# Patient Record
Sex: FEMALE | HISPANIC OR LATINO | Employment: PART TIME | URBAN - METROPOLITAN AREA
[De-identification: names, ages, dates, MRNs, and addresses within clinical notes are randomized per-mention and may not be internally consistent; named-entity substitution may affect disease eponyms.]

---

## 2023-11-02 ENCOUNTER — TRANSFERRED RECORDS (OUTPATIENT)
Dept: MULTI SPECIALTY CLINIC | Facility: CLINIC | Age: 23
End: 2023-11-02

## 2023-11-02 LAB
CHLAMYDIA - HIM PATIENT REPORTED: NORMAL
PAP SMEAR - HIM PATIENT REPORTED: NORMAL

## 2024-01-25 NOTE — PROGRESS NOTES
"Video-Visit Details    Type of service:  Video Visit    Video Start Time: 7:59 am   Video End Time: 8:40 am    Originating Location (pt. Location): Home    Distant Location (provider location): Offsite (providers home)     Platform used for Video Visit: [employer]      New 24 Week Healthy Lifestyle Nutrition Note    Reason for visit: Nutrition Assessment    Yanci Mckeon is a 23 year old female presenting today for new nutrition assessment consult. Pt is referred by Dr. Rosales 1/29/24.    Patient with Co-morbidities of obesity including:  GERD     PMH: irregular menses, depression, takes meds for Hashimotos     Working on establishing primary care in [facility] next month     ANTHROPOMETRICS:   Weight 1/29/24: 182 lbs with BMI 34.39    Estimated body mass index is 34.39 kg/m  as calculated from the following:    Height as of 1/29/24: 1.549 m (5' 1\").    Weight as of 1/29/24: 82.6 kg (182 lb).    MEDICATIONS FOR WEIGHT LOSS:  None    SUPPLEMENTS:  None    NUTRITION HISTORY:  NKFA  No intolerances noted  Not huge on fruit  Has a sweet tooth  Previous weight loss attempts include: exercise, restriction (eating 1 meal/day) - recognizes this was unhealthy but was only thing that helped at that time  RD before: No    Pt goals: weight loss to improve overall health    Current eating habits are inconsistent per pt.   Typically eats 2-3 meals. Occ may have 1 larger meal.   Snacking varies as well - maybe 3-4x/week on average (as making food typically).     Typical food choices  B - eggs, toast with butter  L - pasta  D - rice with salmon/turkey OR out to eat 3-4x/week    Meal preps but often eats something different than the prepped food.     Feels she needs to increase protein/vegetable intake.     Pt shared she struggles with some binge eating - eating past fullness.     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    INTERVENTION:  Nutrition Prescription:  Recommended energy/nutrient modification.    Implementation:  Discussed use of 24-week Journal or application  Reviewed estimated kcal and macronutrient needs  Discussed implementation of exercise  Discussed hydration    Use of meal replacements: no    Patient Understanding: good    GOALS:  1.  Activity goal: Aim to walk on treadmill for 30 min 2-3x/week  2.  Hydration goal: Aim for 2 Fausto s/day (60 oz of water)  3. Food goal: Aim to increase vegetable intake to 1 cup/day  4. Food goal: Aim to be more mindful of fullness when snacking. Goal of stopping when feeling full.     Applications to consider:  - Lose it  - MyfitnessPal  -Better me    Estimated kcal needs for weight loss:    ~1300 kcal/day (with little to no activity accounted for, would recommend more on higher activity days)  *Don t recommend going lower than 1200 kcal     Recommended Macronutrient Needs    Dietary guidelines for Americans   45-65% Carbohydrates   10-35% Protein  20-35% Fat    General Weight loss recommendations   40% Carbohydrates (~130 g/day 1300 kcal/day)   30% Protein  (~97 g/day based on 1300 kcal/day)  30% Fat (~43 g/day based on 1300 kcal/day)     FOLLOW UP  Thursday, March 7th at 2:00 pm     TIME SPENT WITH PATIENT: 41 Minutes     LELA Schofield, RD, LD

## 2024-01-29 ENCOUNTER — VIRTUAL VISIT (OUTPATIENT)
Dept: ENDOCRINOLOGY | Facility: CLINIC | Age: 24
End: 2024-01-29
Payer: COMMERCIAL

## 2024-01-29 VITALS — WEIGHT: 182 LBS | BODY MASS INDEX: 34.36 KG/M2 | HEIGHT: 61 IN

## 2024-01-29 DIAGNOSIS — E66.811 CLASS 1 OBESITY DUE TO EXCESS CALORIES WITHOUT SERIOUS COMORBIDITY WITH BODY MASS INDEX (BMI) OF 34.0 TO 34.9 IN ADULT: Primary | ICD-10-CM

## 2024-01-29 DIAGNOSIS — E66.09 CLASS 1 OBESITY DUE TO EXCESS CALORIES WITHOUT SERIOUS COMORBIDITY WITH BODY MASS INDEX (BMI) OF 34.0 TO 34.9 IN ADULT: Primary | ICD-10-CM

## 2024-01-29 PROCEDURE — 99204 OFFICE O/P NEW MOD 45 MIN: CPT | Mod: 95 | Performed by: INTERNAL MEDICINE

## 2024-01-29 ASSESSMENT — PAIN SCALES - GENERAL: PAINLEVEL: NO PAIN (0)

## 2024-01-29 NOTE — PROGRESS NOTES
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 She was a competitive swimmer through college, and got burnt out with working out. She took a long hiatus and simultaneously her diet quality had worsened. These factors together contributed to her weight gain. Currently she says she on some days she has difficulty limiting her food intake, she meals preps but often will eat something else and not the prepped food. She is mostly sedentary. Her weight is distressing for her, especially on days where she believes that she is overeating.     She denies excessive alcohol intake, denies smoking and use of illicit drugs.       PAST MEDICAL HISTORY  There is no problem list on file for this patient.        MEDICATIONS  Current Outpatient Medications   Medication Sig Dispense Refill    omeprazole (PRILOSEC) 20 MG DR capsule 1 capsule 30 minutes before morning meal Orally Once a day for 30 days           PAST SURGICAL HISTORY  No past surgical history on file.      FAMILY HISTORY  No family history on file.      ALLERGIES   No Known Allergies      OBJECTIVE    Vitals  Ht 1.549 m (5' 1\")   Wt 82.6 kg (182 lb)   BMI 34.39 kg/m      Last 6 BPs  BP Readings from Last 6 Encounters:   No data found for BP       Physical Exam  Virtual Visit    ASSESSMENT AND PLAN    Obesity  Discussed the 24 week plan through [facility]. Patient is very interested and able to afford the plan as well.   - 24 week lifestyle plan  - RTC as needed    Patient understands and agrees with the above assessment and plan. Patient was staffed and seen with Dr." Bobby    Follow up  RTC as needed      Thu Gray, DO  Internal Medicine PGY-3

## 2024-01-29 NOTE — LETTER
"1/29/2024       RE: Yanci Mckeon  [address]  [place] [address]     Dear Colleague,    Thank you for referring your patient, Yanci Mckeon, to the [facility] WEIGHT MANAGEMENT CLINIC [place] at [facility]. Please see a copy of my visit note below.                     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 clinic to discuss the 24-week lifestyle plan offered through [facility].     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 She was a competitive swimmer through college, and got burnt out with working out. She took a long hiatus and simultaneously her diet quality had worsened. These factors together contributed to her weight gain. Currently she says she on some days she has difficulty limiting her food intake, she meals preps but often will eat something else and not the prepped food. She is mostly sedentary. Her weight is distressing for her, especially on days where she believes that she is overeating.     She denies excessive alcohol intake, denies smoking and use of illicit drugs.       PAST MEDICAL HISTORY  There is no problem list on file for this patient.        MEDICATIONS  Current Outpatient Medications   Medication Sig Dispense Refill    omeprazole (PRILOSEC) 20 MG DR capsule 1 capsule 30 minutes before morning meal Orally Once a day for 30 days           PAST SURGICAL HISTORY  No past surgical history on file.      FAMILY HISTORY  No family history on file.      ALLERGIES   No Known Allergies      OBJECTIVE    Vitals  Ht 1.549 m (5' 1\")   Wt 82.6 kg (182 lb)   BMI 34.39 kg/m      Last 6 BPs  BP Readings from Last 6 Encounters:   No data found for BP "       Physical Exam  Virtual Visit    ASSESSMENT AND PLAN    Obesity  Discussed the 24 week plan through [facility]. Patient is very interested and able to afford the plan as well.   - 24 week lifestyle plan  - RTC as needed    Patient understands and agrees with the above assessment and plan. Patient was staffed and seen with Dr. Rosales    Follow up  RTC as needed      Thu Gray, DO  Internal Medicine PGY-3      Virtual Visit Details    Attest to note of medical resident which reflects our work together.    Joined the call at 1/29/2024, 9:20:23 am.  Left the call at 1/29/2024, 9:45:35 am    Originating Location (pt. Location): Home    Distant Location (provider location):  Off-site  Platform used for Video Visit: Lien Rosales MD

## 2024-01-29 NOTE — NURSING NOTE
Is the patient currently in the state of [place]? NO    Visit mode:VIDEO    If the visit is dropped, the patient can be reconnected by: VIDEO VISIT: Text to cell phone:   Telephone Information:   Mobile [phone]       Will anyone else be joining the visit? NO  (If patient encounters technical issues they should call [phone])    How would you like to obtain your AVS? MyChart    Are changes needed to the allergy or medication list? No  And pt stated staking levothyroxine 25 micrograms   Reason for visit: Consult    Melany LAUF

## 2024-01-29 NOTE — PROGRESS NOTES
Virtual Visit Details    Attest to note of medical resident which reflects our work together.    Joined the call at 1/29/2024, 9:20:23 am.  Left the call at 1/29/2024, 9:45:35 am    Originating Location (pt. Location): Home    Distant Location (provider location):  Off-site  Platform used for Video Visit: Lien

## 2024-01-30 ENCOUNTER — VIRTUAL VISIT (OUTPATIENT)
Dept: ENDOCRINOLOGY | Facility: CLINIC | Age: 24
End: 2024-01-30
Payer: COMMERCIAL

## 2024-01-30 VITALS — HEIGHT: 61 IN | WEIGHT: 182 LBS | BODY MASS INDEX: 34.36 KG/M2

## 2024-01-30 DIAGNOSIS — E66.9 OBESITY: ICD-10-CM

## 2024-01-30 DIAGNOSIS — Z71.3 NUTRITIONAL COUNSELING: Primary | ICD-10-CM

## 2024-01-30 PROCEDURE — 97802 MEDICAL NUTRITION INDIV IN: CPT | Mod: 95 | Performed by: DIETITIAN, REGISTERED

## 2024-01-30 PROCEDURE — 99207 PR NO CHARGE LOS: CPT | Mod: 95 | Performed by: DIETITIAN, REGISTERED

## 2024-01-30 ASSESSMENT — PAIN SCALES - GENERAL: PAINLEVEL: NO PAIN (0)

## 2024-01-30 NOTE — LETTER
"1/30/2024       RE: Yanci Mckeon  [address]  [place] [address]     Dear Colleague,    Thank you for referring your patient, Yanci Mckeon, to the [facility] WEIGHT MANAGEMENT CLINIC [place] at [facility]. Please see a copy of my visit note below.    Video-Visit Details    Type of service:  Video Visit    Video Start Time: 7:59 am   Video End Time: 8:40 am    Originating Location (pt. Location): Home    Distant Location (provider location): Offsite (providers home)     Platform used for Video Visit: [employer] 24 Week Healthy Lifestyle Nutrition Note    Reason for visit: Nutrition Assessment    Yanci Mckeon is a 23 year old female presenting today for new nutrition assessment consult. Pt is referred by Dr. Rosales 1/29/24.    Patient with Co-morbidities of obesity including:  GERD     PMH: irregular menses, depression, takes meds for Hashimotos     Working on establishing primary care in [facility] next month     ANTHROPOMETRICS:   Weight 1/29/24: 182 lbs with BMI 34.39    Estimated body mass index is 34.39 kg/m  as calculated from the following:    Height as of 1/29/24: 1.549 m (5' 1\").    Weight as of 1/29/24: 82.6 kg (182 lb).    MEDICATIONS FOR WEIGHT LOSS:  None    SUPPLEMENTS:  None    NUTRITION HISTORY:  NKFA  No intolerances noted  Not huge on fruit  Has a sweet tooth  Previous weight loss attempts include: exercise, restriction (eating 1 meal/day) - recognizes this was unhealthy but was only thing that helped at that time  RD before: No    Pt goals: weight loss to improve overall health    Current eating habits are inconsistent per pt.   Typically eats 2-3 meals. Occ may have 1 larger meal.   Snacking varies as well - maybe 3-4x/week on average (as making food typically).     Typical food choices  B - eggs, toast with butter  L - pasta  D - rice with salmon/turkey OR out to eat 3-4x/week    Meal preps but often " "eats something different than the prepped food.     Feels she needs to increase protein/vegetable intake.     Pt shared she struggles with some binge eating - eating past fullness.     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    INTERVENTION:  Nutrition Prescription:  Recommended energy/nutrient modification.    Implementation:  Discussed use of 24-week Journal or application  Reviewed estimated kcal and macronutrient needs  Discussed implementation of exercise  Discussed hydration    Use of meal replacements: no    Patient Understanding: good    GOALS:  1.  Activity goal: Aim to walk on treadmill for 30 min 2-3x/week  2.  Hydration goal: Aim for 2 Kiel s/day (60 oz of water)  3. Food goal: Aim to increase vegetable intake to 1 cup/day  4. Food goal: Aim to be more mindful of fullness when snacking. Goal of stopping when feeling full.     Applications to consider:  - Lose it  - MyfitnessPal  -Better me    Estimated kcal needs for weight loss:    ~1300 kcal/day (with little to no activity accounted for, would recommend more on higher activity days)  *Don t recommend going lower than 1200 kcal     Recommended Macronutrient Needs    Dietary guidelines for Americans   45-65% Carbohydrates   10-35% Protein  20-35% Fat    General Weight loss recommendations   40% Carbohydrates (~130 g/day 1300 kcal/day)   30% Protein  (~97 g/day based on 1300 " kcal/day)  30% Fat (~43 g/day based on 1300 kcal/day)     FOLLOW UP  Thursday, March 7th at 2:00 pm     TIME SPENT WITH PATIENT: 41 Minutes     LELA Schofield, RD, LD

## 2024-01-30 NOTE — PATIENT INSTRUCTIONS
GOALS:  1.  Activity goal: Aim to walk on treadmill for 30 min 2-3x/week  2.  Hydration goal: Aim for 2 Fausto s/day (60 oz of water)  3. Food goal: Aim to increase vegetable intake to 1 cup/day  4. Food goal: Aim to be more mindful of fullness when snacking. Goal of stopping when feeling full.     Applications to consider:  - Lose it  - MyfitnessPal  -Better me    Estimated kcal needs for weight loss:    ~1300 kcal/day (with little to no activity accounted for, would recommend more on higher activity days)  *Don t recommend going lower than 1200 kcal     Recommended Macronutrient Needs    Dietary guidelines for Americans   45-65% Carbohydrates   10-35% Protein  20-35% Fat    General Weight loss recommendations   40% Carbohydrates (~130 g/day 1300 kcal/day)   30% Protein  (~97 g/day based on 1300 kcal/day)  30% Fat (~43 g/day based on 1300 kcal/day)     FOLLOW UP  Thursday, March 7th at 2:00 pm     Elsy Galeana (Duncan), MPP-D, RD, LD  Clinic #: [phone]

## 2024-01-30 NOTE — NURSING NOTE
Is the patient currently in the state of [place]? YES    Visit mode:VIDEO    If the visit is dropped, the patient can be reconnected by: VIDEO VISIT: Send to e-mail at: [email or web address]    Will anyone else be joining the visit? NO  (If patient encounters technical issues they should call [phone])    How would you like to obtain your AVS? MyChart    Are changes needed to the allergy or medication list? N/A    Reason for visit: Consult (24 Week Plan RD)    Iman DE LA CRUZ

## 2024-02-01 ENCOUNTER — VIRTUAL VISIT (OUTPATIENT)
Dept: ENDOCRINOLOGY | Facility: CLINIC | Age: 24
End: 2024-02-01

## 2024-02-01 DIAGNOSIS — E66.9 OBESITY: Primary | ICD-10-CM

## 2024-02-01 PROCEDURE — 99207 PR NO CHARGE LOS: CPT | Mod: 95

## 2024-02-01 NOTE — PROGRESS NOTES
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  Competitive Swimmer in College  Dr Rosales and Elsy Galeana  Will continue to see Elsy and me and not Bobby;does not think she needs to see him again or may try another provider, and not currently on any weight management med    Likes to walk on my off work days  Walking for 30 minutes 3 times per week    OVERALL GOALS FOR THE 24 week plan:  I want to be on a healthy path; to be proactive with positive health outcomes  Regular consistent Meal times  Stop eating when I am full; portion control  Positive outlook about my body; confident in my body  Better mood; less depressed and less anxious, bc I accept and am confident in my body      NOTES:  1300 calorie plan, per RD  Feel confident with myself and my body with my significant other  Gym; is double edge sword; I feel good but I compare. Social Media; Good friends who  Are too unhealthy with eating patterns    Brainstorm ideas to improve body image/ self esteem; Identify when I am doing it. \"Name it to Tame it\"   Wishing my friends well and hope the best for them. I am on my own journey  Limit time on [employer] and [employer] to 2 hours. Set time limits on my phone and obey them  Journal one kind thing about myself each day  Journal about my ideal non-work day      1.         Activity goal: Aim to walk on treadmill for 30 min 2-3x/week  2.         Hydration goal: Aim for 2 Fausto s/day (60 oz of water)  3.         Food goal: Aim to increase vegetable intake to 1 cup/day  4.         Food goal: Aim to be more mindful of fullness when snacking. Goal of stopping when feeling full.   5.          Myfitness pal    HEALTH  VISIT: Feb 12 at 9:30 am, video visit     40 minutes time spent  "

## 2024-02-12 ENCOUNTER — VIRTUAL VISIT (OUTPATIENT)
Dept: ENDOCRINOLOGY | Facility: CLINIC | Age: 24
End: 2024-02-12

## 2024-02-12 DIAGNOSIS — E66.3 OVERWEIGHT: Primary | ICD-10-CM

## 2024-02-12 PROCEDURE — 99207 PR NO CHARGE LOS: CPT | Mod: 95

## 2024-02-12 PROCEDURE — 99207 PR MEDICAL WEIGHT MANAGEMENT PROGRAM EMPLOYEE ENROLLMENT: CPT | Mod: 95

## 2024-02-12 NOTE — PROGRESS NOTES
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 Discussed ideas of self care for emotional wellbeing. Not sleeping well; not feeling hungry    GOALS/REMINDERS:    1.) Continue with my daily wellness journal  2.) Try out a calming wind down routine, 30 minutes before bed (screen-free; or use screen mindfully) Aim for 6 or more hours of sleep per night  3.) Consider Journaling/Free Writing to work through difficult thoughts and emotions  4.) 5 senses Mindfulness each day; chose one way to be in the moment with a senses/sensory experience and record in my journal (ex: feel sunlight on my face)  5.) Positive Self Talk; I am doing meaningful purposeful work and I am proud of that. I know deep down that that person is not for me. I will get through it. I deserve love at the highest level and I know I will have that in time  (Poetry for inspiration and support)        NEXT HEALTH  VISIT SCHEDULED FOR: FEB 21 at 9 am, video visit      FOLLOW-UP:    To schedule your next visit, please call [phone].      TIME: 45 minutes         SIGNATURE:  Micky Altamirano, Certified Health  on 5/6/2021 at 10:37 AM

## 2024-02-12 NOTE — LETTER
2/12/2024       RE: Yanci Mckeon  [address]  [place] [address]     Dear Colleague,    Thank you for referring your patient, Yanci Mckeon, to the [facility] WEIGHT MANAGEMENT CLINIC [facility]. Please see a copy of my visit note below.    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 Discussed ideas of self care for emotional wellbeing. Not sleeping well; not feeling hungry    GOALS/REMINDERS:    1.) Continue with my daily wellness journal  2.) Try out a calming wind down routine, 30 minutes before bed (screen-free; or use screen mindfully) Aim for 6 or more hours of sleep per night  3.) Consider Journaling/Free Writing to work through difficult thoughts and emotions  4.) 5 senses Mindfulness each day; chose one way to be in the moment with a senses/sensory experience and record in my journal (ex: feel sunlight on my face)  5.) Positive Self Talk; I am doing meaningful purposeful work and I am proud of that. I know deep down that that person is not for me. I will get through it. I deserve love at the highest level and I know I will have that in time  (Poetry for inspiration and support)        NEXT HEALTH  VISIT SCHEDULED FOR: FEB 21 at 9 am,  video visit      FOLLOW-UP:    To schedule your next visit, please call [phone].      TIME: 45 minutes         SIGNATURE:  Amy Noland, Certified Health  on 5/6/2021 at 10:37 AM

## 2024-02-21 ENCOUNTER — VIRTUAL VISIT (OUTPATIENT)
Dept: ENDOCRINOLOGY | Facility: CLINIC | Age: 24
End: 2024-02-21

## 2024-02-21 DIAGNOSIS — E66.3 OVERWEIGHT: Primary | ICD-10-CM

## 2024-02-21 PROCEDURE — 99207 PR NO CHARGE LOS: CPT | Mod: 95

## 2024-02-21 NOTE — LETTER
2/21/2024       RE: Yanci Mckeon  [address]  [place] [address]     Dear Colleague,    Thank you for referring your patient, Yanci Mckeon, to the [facility] WEIGHT MANAGEMENT CLINIC [place] at [facility]. Please see a copy of my visit note below.    24 wk plan visit 2  SHANT Rosales      1.) Journal  2.) Bedtime Routine  3.) Positive Self Talk around break-up      GOALS:  Tredmil for 30 min, 4 days per week (I will set a time on google calendar the night before)  I will eat 1300 k per day  I will continue to read personal growth books and journal when helpful  I will continue to get 7--8 hours of sleep and have a 10-15 min bedtime routine    NEXT HEALTH  VISIT: MARCH 8 at 9 am, video visit    TIME SPENT: 30 min    Amy ALCARAZ-[facility], National Board Certified Health &   Lead Health   [facility] Weight Management  [email or web address]   To schedule: [phone]   Gender pronouns: she/her

## 2024-02-21 NOTE — PROGRESS NOTES
24 wk plan visit 2  SHANT Rosales      1.) Journal  2.) Bedtime Routine  3.) Positive Self Talk around break-up      GOALS:  Tredmil for 30 min, 4 days per week (I will set a time on google calendar the night before)  I will eat 1300 k per day  I will continue to read personal growth books and journal when helpful  I will continue to get 7--8 hours of sleep and have a 10-15 min bedtime routine    NEXT HEALTH  VISIT: MARCH 8 at 9 am, video visit    TIME SPENT: 30 min    Amy Noland  [facility], National Board Certified Health &   Lead Health   [facility] Comprehensive Weight Management  [email or web address]   To schedule: [phone]   Gender pronouns: she/her

## 2024-03-06 NOTE — PROGRESS NOTES
"Video-Visit Details    Type of service:  Video Visit    Video Start Time: 2:02 pm  Video End Time: 2:16 pm    Originating Location (pt. Location): Home    Distant Location (provider location): Offsite (providers home)     Platform used for Video Visit: [employer]    24 Week Healthy Lifestyle Nutrition Note    Reason for visit: Nutrition Assessment    Yanci Mckeon is a 23 year old female presenting today for nutrition assessment consult. Pt is referred by Dr. Rosales 1/29/24.    Patient with Co-morbidities of obesity including:  GERD     PMH: irregular menses, depression, takes meds for Hashimotos     ANTHROPOMETRICS:   Weight 1/29/24: 182 lbs with BMI 34.39    Estimated body mass index is 33.29 kg/m  as calculated from the following:    Height as of this encounter: 1.575 m (5' 2\").    Weight as of 1/30/24: 82.6 kg (182 lb).    Current weight: 182 lbs, pt report from 1/30    MEDICATIONS FOR WEIGHT LOSS:  None    SUPPLEMENTS:  None    NUTRITION HISTORY:  NKFA  No intolerances noted  Not huge on fruit, vegetables or nuts per pt.   Has a sweet tooth  Previous weight loss attempts include: exercise, restriction (eating 1 meal/day) - recognizes this was unhealthy but was only thing that helped at that time  RD before: No    Pt goals: weight loss to improve overall health    Current eating habits are inconsistent per pt.   Typically eats 2-3 meals. Occ may have 1 larger meal.   Snacking varies as well - maybe 3-4x/week on average (as making food typically).     Typical food choices  B - eggs, toast with butter  L - pasta  D - rice with salmon/turkey OR out to eat 3-4x/week    Meal preps but often eats something different than the prepped food.     Feels she needs to increase protein/vegetable intake.     Pt shared she struggles with some binge eating - eating past fullness.     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 Gets nauseous with bigger meals.     Hydration: 1 Fausto of water/day at most (30 oz), occ matcha or coffee     PA: signed up for 5k in April    Previous goals:  1.  Activity goal: Aim to walk on treadmill for 30 min 2-3x/week - In progress, her and her roommate recently signed up for a 5K in April. Hoping this increase in movement will help with appetite.   2.  Hydration goal: Aim for 2 Fausto s/day (60 oz of water) - In progress, getting 1 per day at most  3. Food goal: Aim to increase vegetable intake to 1 cup/day - N/A right now  4. Food goal: Aim to be more mindful of fullness when snacking. Goal of stopping when feeling full. 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    INTERVENTION:  Nutrition Prescription:  Recommended energy/nutrient modification.    Implementation:  Discussed use of 24-week Journal or application  Reviewed estimated kcal and macronutrient needs  Discussed implementation of exercise  Discussed hydration    Use of meal replacements: no    Patient Understanding: good    GOALS:    Recommend utilizing protein shakes to help with intake  Consider smaller, more frequent eating  Consider grab n go options such as individual carrot bags  Hydration goal: Aim for 2 Fausto s/day (60 oz of water  Activity goal: 5K next month    Snack ideas:   - Cheese   - Cottage cheese   - Protein bars   - Protein shakes   - Yogurt    - Hard  boiled eggs   - Meat sticks (recommend turkey more often than beef/pork)   - Raw veggies with dip (hummus, ranch, dill dip, guacamole, cottage cheese)    - Puff corn    - Popcorn    - Crackers      Applications to consider:  - Lose it  - MyfitnessPal  -Better me    Estimated kcal needs for weight loss:    ~1300 kcal/day (with little to no activity accounted for, would recommend more on higher activity days)  *Don t recommend going lower than 1200 kcal     Recommended Macronutrient Needs    Dietary guidelines for Americans   45-65% Carbohydrates   10-35% Protein  20-35% Fat    General Weight loss recommendations   40% Carbohydrates (~130 g/day 1300 kcal/day)   30% Protein  (~97 g/day based on 1300 kcal/day)  30% Fat (~43 g/day based on 1300 kcal/day)     FOLLOW UP  Thursday, April 4th at 9:30 am    TIME SPENT WITH PATIENT: 14 Minutes     LELA Schofield, RD, LD

## 2024-03-07 ENCOUNTER — VIRTUAL VISIT (OUTPATIENT)
Dept: ENDOCRINOLOGY | Facility: CLINIC | Age: 24
End: 2024-03-07
Payer: COMMERCIAL

## 2024-03-07 VITALS — BODY MASS INDEX: 33.29 KG/M2 | HEIGHT: 62 IN

## 2024-03-07 DIAGNOSIS — Z71.3 NUTRITIONAL COUNSELING: Primary | ICD-10-CM

## 2024-03-07 DIAGNOSIS — E66.9 OBESITY: ICD-10-CM

## 2024-03-07 PROCEDURE — 99207 PR NO CHARGE LOS: CPT | Mod: 95 | Performed by: DIETITIAN, REGISTERED

## 2024-03-07 PROCEDURE — 97803 MED NUTRITION INDIV SUBSEQ: CPT | Mod: 95 | Performed by: DIETITIAN, REGISTERED

## 2024-03-07 ASSESSMENT — PAIN SCALES - GENERAL: PAINLEVEL: NO PAIN (0)

## 2024-03-07 NOTE — LETTER
"3/7/2024       RE: Yanci Mckeon  [address]  [place] [facility] [address]     Dear Colleague,    Thank you for referring your patient, Yanci Mckeon, to the [facility]NAG[facility] [place] at [facility]. Please see a copy of my visit note below.    Video-Visit Details    Type of service:  Video Visit    Video Start Time: 2:02 pm  Video End Time: 2:16 pm    Originating Location (pt. Location): Home    Distant Location (provider location): Offsite (providers home)     Platform used for Video Visit: [employer]    24 Week Healthy Lifestyle Nutrition Note    Reason for visit: Nutrition Assessment    Yanci Mckeon is a 23 year old female presenting today for nutrition assessment consult. Pt is referred by Dr. Rosales 1/29/24.    Patient with Co-morbidities of obesity including:  GERD     PMH: irregular menses, depression, takes meds for Hashimotos     ANTHROPOMETRICS:   Weight 1/29/24: 182 lbs with BMI 34.39    Estimated body mass index is 33.29 kg/m  as calculated from the following:    Height as of this encounter: 1.575 m (5' 2\").    Weight as of 1/30/24: 82.6 kg (182 lb).    Current weight: 182 lbs, pt report from 1/30    MEDICATIONS FOR WEIGHT LOSS:  None    SUPPLEMENTS:  None    NUTRITION HISTORY:  NKFA  No intolerances noted  Not huge on fruit, vegetables or nuts per pt.   Has a sweet tooth  Previous weight loss attempts include: exercise, restriction (eating 1 meal/day) - recognizes this was unhealthy but was only thing that helped at that time  RD before: No    Pt goals: weight loss to improve overall health    Current eating habits are inconsistent per pt.   Typically eats 2-3 meals. Occ may have 1 larger meal.   Snacking varies as well - maybe 3-4x/week on average (as making food typically).     Typical food choices  B - eggs, toast with butter  L - pasta  D - rice with salmon/turkey OR out to eat 3-4x/week    Meal preps but " "often eats something different than the prepped food.     Feels she needs to increase protein/vegetable intake.     Pt shared she struggles with some binge eating - eating past fullness.     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 Gets nauseous with bigger meals.     Hydration: 1 Kiel of water/day at most (30 oz), occ matcha or coffee     PA: signed up for 5k in April    Previous goals:  1.  Activity goal: Aim to walk on treadmill for 30 min 2-3x/week - In progress, her and her roommate recently signed up for a 5K in April. Hoping this increase in movement will help with appetite.   2.  Hydration goal: Aim for 2 Kiel s/day (60 oz of water) - In progress, getting 1 per day at most  3. Food goal: Aim to increase vegetable intake to 1 cup/day - N/A right now  4. Food goal: Aim to be more mindful of fullness when snacking. Goal of stopping when feeling full. 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    INTERVENTION:  Nutrition Prescription:  Recommended energy/nutrient modification.    Implementation:  Discussed use of 24-week Journal or application  Reviewed estimated kcal and macronutrient needs  Discussed implementation of exercise  Discussed hydration    Use of meal replacements: no    Patient Understanding: good    GOALS:    Recommend " utilizing protein shakes to help with intake  Consider smaller, more frequent eating  Consider grab n go options such as individual carrot bags  Hydration goal: Aim for 2 Fausto s/day (60 oz of water  Activity goal: 5K next month    Snack ideas:   - Cheese   - Cottage cheese   - Protein bars   - Protein shakes   - Yogurt    - Hard boiled eggs   - Meat sticks (recommend turkey more often than beef/pork)   - Raw veggies with dip (hummus, ranch, dill dip, guacamole, cottage cheese)    - Puff corn    - Popcorn    - Crackers      Applications to consider:  - Lose it  - MyfitnessPal  -Better me    Estimated kcal needs for weight loss:    ~1300 kcal/day (with little to no activity accounted for, would recommend more on higher activity days)  *Don t recommend going lower than 1200 kcal     Recommended Macronutrient Needs    Dietary guidelines for Americans   45-65% Carbohydrates   10-35% Protein  20-35% Fat    General Weight loss recommendations   40% Carbohydrates (~130 g/day 1300 kcal/day)   30% Protein  (~97 g/day based on 1300 kcal/day)  30% Fat (~43 g/day based on 1300 kcal/day)     FOLLOW UP  Thursday, April 4th at 9:30 am    TIME SPENT WITH PATIENT: 14 Minutes     LELA Schofield, RD, LD

## 2024-03-07 NOTE — PATIENT INSTRUCTIONS
GOALS:    Recommend utilizing protein shakes to help with intake  Consider smaller, more frequent eating  Consider grab n go options such as individual carrot bags  Hydration goal: Aim for 2 Fausto s/day (60 oz of water  Activity goal: 5K next month    Snack ideas:   - Cheese   - Cottage cheese   - Protein bars   - Protein shakes   - Yogurt    - Hard boiled eggs   - Meat sticks (recommend turkey more often than beef/pork)   - Raw veggies with dip (hummus, ranch, dill dip, guacamole, cottage cheese)    - Puff corn    - Popcorn    - Crackers      Applications to consider:  - Lose it  - MyfitnessPal  -Better me    Estimated kcal needs for weight loss:    ~1300 kcal/day (with little to no activity accounted for, would recommend more on higher activity days)  *Don t recommend going lower than 1200 kcal     Recommended Macronutrient Needs    Dietary guidelines for Americans   45-65% Carbohydrates   10-35% Protein  20-35% Fat    General Weight loss recommendations   40% Carbohydrates (~130 g/day 1300 kcal/day)   30% Protein  (~97 g/day based on 1300 kcal/day)  30% Fat (~43 g/day based on 1300 kcal/day)     FOLLOW UP  Thursday, April 4th at 9:30 am    Elsy Galeana ([place]), VIRGIL-D, RD, LD  Clinic #: [phone]

## 2024-03-07 NOTE — NURSING NOTE
Is the patient currently in the state of [place]? YES    Visit mode:VIDEO    If the visit is dropped, the patient can be reconnected by: VIDEO VISIT: Text to cell phone:   Telephone Information:   Mobile [phone]       Will anyone else be joining the visit? NO  (If patient encounters technical issues they should call [phone]    How would you like to obtain your AVS? MyChart    Are changes needed to the allergy or medication list? N/A  Please remove any meds marked not taking and any flagged for removal.    Reason for visit: RECHECK    Wt/ht other than 24 hrs:  none given for wt  Pain more than one location:  no  Sola LAUF

## 2024-03-08 ENCOUNTER — VIRTUAL VISIT (OUTPATIENT)
Dept: SURGERY | Facility: CLINIC | Age: 24
End: 2024-03-08

## 2024-03-08 DIAGNOSIS — E66.3 OVERWEIGHT: Primary | ICD-10-CM

## 2024-03-08 PROCEDURE — 99207 PR MWM HEALTH COACH NO CHARGE: CPT | Mod: 95

## 2024-03-08 NOTE — PROGRESS NOTES
24 wk visit 3 Bobby, video visit    Previous Goals;   Tredmil for 30 min, 4 days per week (I will set a time on google calendar the night before)  I will eat 1300 k per day  I will continue to read personal growth books and journal when helpful  I will continue to get 7--8 hours of sleep and have a 10-15 min bedtime routine    Signed up for a 5 k with my roommate  Sees a therapist; helping with healing from the break-up and moving on    GOALS: March 8, 2024  Work-out 4 times per week; follow our run/walk plan 3 times per week, and then 1 time  per week chose something else; wellbeats; yoga  (Run with my roommate, or brother at home; make sure to work-out right after work before goring inside to relax)    Plan one friend-date per week    Look into a fun weekly activity or class to sign up for    STOP (when I feel overwhelmed)    NEXT HEALTH  VISIT: MARCH 22 at 8 am video visit            Amy Noland  [facility], National Board Certified Health &   Lead Health   [facility]  [email or web address]  [email or web address]   To schedule: [phone]   Gender pronouns: she/her

## 2024-03-10 ENCOUNTER — HEALTH MAINTENANCE LETTER (OUTPATIENT)
Age: 24
End: 2024-03-10

## 2024-03-22 ENCOUNTER — VIRTUAL VISIT (OUTPATIENT)
Dept: ENDOCRINOLOGY | Facility: CLINIC | Age: 24
End: 2024-03-22

## 2024-03-22 ENCOUNTER — OFFICE VISIT (OUTPATIENT)
Dept: FAMILY MEDICINE | Facility: CLINIC | Age: 24
End: 2024-03-22
Payer: COMMERCIAL

## 2024-03-22 VITALS
SYSTOLIC BLOOD PRESSURE: 113 MMHG | HEIGHT: 62 IN | WEIGHT: 168.4 LBS | HEART RATE: 60 BPM | BODY MASS INDEX: 30.99 KG/M2 | TEMPERATURE: 97.4 F | OXYGEN SATURATION: 99 % | DIASTOLIC BLOOD PRESSURE: 74 MMHG | RESPIRATION RATE: 14 BRPM

## 2024-03-22 DIAGNOSIS — E06.3 HYPOTHYROIDISM DUE TO HASHIMOTO'S THYROIDITIS: Primary | ICD-10-CM

## 2024-03-22 DIAGNOSIS — N93.8 DUB (DYSFUNCTIONAL UTERINE BLEEDING): ICD-10-CM

## 2024-03-22 DIAGNOSIS — E66.3 OVERWEIGHT: Primary | ICD-10-CM

## 2024-03-22 LAB
ALBUMIN SERPL BCG-MCNC: 4.6 G/DL (ref 3.5–5.2)
ALP SERPL-CCNC: 66 U/L (ref 40–150)
ALT SERPL W P-5'-P-CCNC: 32 U/L (ref 0–50)
ANION GAP SERPL CALCULATED.3IONS-SCNC: 11 MMOL/L (ref 7–15)
AST SERPL W P-5'-P-CCNC: 24 U/L (ref 0–45)
BILIRUB SERPL-MCNC: 0.8 MG/DL
BUN SERPL-MCNC: 12.1 MG/DL (ref 6–20)
CALCIUM SERPL-MCNC: 9.5 MG/DL (ref 8.6–10)
CHLORIDE SERPL-SCNC: 106 MMOL/L (ref 98–107)
CREAT SERPL-MCNC: 0.85 MG/DL (ref 0.51–0.95)
DEPRECATED HCO3 PLAS-SCNC: 24 MMOL/L (ref 22–29)
DHEA-S SERPL-MCNC: 259 UG/DL (ref 35–430)
EGFRCR SERPLBLD CKD-EPI 2021: >90 ML/MIN/1.73M2
ERYTHROCYTE [DISTWIDTH] IN BLOOD BY AUTOMATED COUNT: 12.6 % (ref 10–15)
FSH SERPL IRP2-ACNC: 5.5 MIU/ML
GLUCOSE SERPL-MCNC: 93 MG/DL (ref 70–99)
HCT VFR BLD AUTO: 38.4 % (ref 35–47)
HGB BLD-MCNC: 12.7 G/DL (ref 11.7–15.7)
LH SERPL-ACNC: 11.1 MIU/ML
MCH RBC QN AUTO: 29.6 PG (ref 26.5–33)
MCHC RBC AUTO-ENTMCNC: 33.1 G/DL (ref 31.5–36.5)
MCV RBC AUTO: 90 FL (ref 78–100)
PLATELET # BLD AUTO: 276 10E3/UL (ref 150–450)
POTASSIUM SERPL-SCNC: 3.9 MMOL/L (ref 3.4–5.3)
PROLACTIN SERPL 3RD IS-MCNC: 24 NG/ML (ref 5–23)
PROT SERPL-MCNC: 7.1 G/DL (ref 6.4–8.3)
RBC # BLD AUTO: 4.29 10E6/UL (ref 3.8–5.2)
SHBG SERPL-SCNC: 9 NMOL/L (ref 30–135)
SODIUM SERPL-SCNC: 141 MMOL/L (ref 135–145)
T3 SERPL-MCNC: 95 NG/DL (ref 85–202)
T4 FREE SERPL-MCNC: 1.23 NG/DL (ref 0.9–1.7)
TSH SERPL DL<=0.005 MIU/L-ACNC: 3.61 UIU/ML (ref 0.3–4.2)
WBC # BLD AUTO: 5.9 10E3/UL (ref 4–11)

## 2024-03-22 PROCEDURE — 99204 OFFICE O/P NEW MOD 45 MIN: CPT | Performed by: FAMILY MEDICINE

## 2024-03-22 PROCEDURE — 83498 ASY HYDROXYPROGESTERONE 17-D: CPT | Performed by: FAMILY MEDICINE

## 2024-03-22 PROCEDURE — 83001 ASSAY OF GONADOTROPIN (FSH): CPT | Performed by: FAMILY MEDICINE

## 2024-03-22 PROCEDURE — 36415 COLL VENOUS BLD VENIPUNCTURE: CPT | Performed by: FAMILY MEDICINE

## 2024-03-22 PROCEDURE — 83002 ASSAY OF GONADOTROPIN (LH): CPT | Performed by: FAMILY MEDICINE

## 2024-03-22 PROCEDURE — 82627 DEHYDROEPIANDROSTERONE: CPT | Performed by: FAMILY MEDICINE

## 2024-03-22 PROCEDURE — 84403 ASSAY OF TOTAL TESTOSTERONE: CPT | Performed by: FAMILY MEDICINE

## 2024-03-22 PROCEDURE — 84439 ASSAY OF FREE THYROXINE: CPT | Performed by: FAMILY MEDICINE

## 2024-03-22 PROCEDURE — 84480 ASSAY TRIIODOTHYRONINE (T3): CPT | Performed by: FAMILY MEDICINE

## 2024-03-22 PROCEDURE — 84146 ASSAY OF PROLACTIN: CPT | Performed by: FAMILY MEDICINE

## 2024-03-22 PROCEDURE — 84443 ASSAY THYROID STIM HORMONE: CPT | Performed by: FAMILY MEDICINE

## 2024-03-22 PROCEDURE — 84270 ASSAY OF SEX HORMONE GLOBUL: CPT | Performed by: FAMILY MEDICINE

## 2024-03-22 PROCEDURE — 99207 PR NO CHARGE LOS: CPT | Mod: 95

## 2024-03-22 PROCEDURE — 80053 COMPREHEN METABOLIC PANEL: CPT | Performed by: FAMILY MEDICINE

## 2024-03-22 PROCEDURE — 85027 COMPLETE CBC AUTOMATED: CPT | Performed by: FAMILY MEDICINE

## 2024-03-22 RX ORDER — LEVOTHYROXINE SODIUM 25 UG/1
25 TABLET ORAL DAILY
COMMUNITY
Start: 2023-01-01

## 2024-03-22 RX ORDER — ESCITALOPRAM OXALATE 10 MG/1
10 TABLET ORAL DAILY
COMMUNITY
Start: 2023-01-01

## 2024-03-22 ASSESSMENT — ANXIETY QUESTIONNAIRES
GAD7 TOTAL SCORE: 11
7. FEELING AFRAID AS IF SOMETHING AWFUL MIGHT HAPPEN: SEVERAL DAYS
2. NOT BEING ABLE TO STOP OR CONTROL WORRYING: MORE THAN HALF THE DAYS
5. BEING SO RESTLESS THAT IT IS HARD TO SIT STILL: SEVERAL DAYS
3. WORRYING TOO MUCH ABOUT DIFFERENT THINGS: NEARLY EVERY DAY
1. FEELING NERVOUS, ANXIOUS, OR ON EDGE: MORE THAN HALF THE DAYS
GAD7 TOTAL SCORE: 11
IF YOU CHECKED OFF ANY PROBLEMS ON THIS QUESTIONNAIRE, HOW DIFFICULT HAVE THESE PROBLEMS MADE IT FOR YOU TO DO YOUR WORK, TAKE CARE OF THINGS AT HOME, OR GET ALONG WITH OTHER PEOPLE: SOMEWHAT DIFFICULT
6. BECOMING EASILY ANNOYED OR IRRITABLE: NOT AT ALL

## 2024-03-22 ASSESSMENT — PATIENT HEALTH QUESTIONNAIRE - PHQ9
SUM OF ALL RESPONSES TO PHQ QUESTIONS 1-9: 10
5. POOR APPETITE OR OVEREATING: MORE THAN HALF THE DAYS

## 2024-03-22 ASSESSMENT — PAIN SCALES - GENERAL: PAINLEVEL: NO PAIN (0)

## 2024-03-22 NOTE — LETTER
3/22/2024       RE: Yanci Mckeon  [address]  [place] [address]     Dear Colleague,    Thank you for referring your patient, Yanci Mckeon, to the [facility]NAGEMENT [facility] [place] at [facility]. Please see a copy of my visit note below.    24wk visit 4  SHANT Rosales    PREVIOUS GOALS:  Work-out 4 times per week; follow our run/walk plan 3 times per week, and then 1 time  per week chose something else; wellbeats; yoga  (Run with my roommate, or brother at home; make sure to work-out right after work before goring inside to relax)     Plan one friend-date per week     Look into a fun weekly activity or class to sign up for     STOP (when I feel overwhelmed)      GOALS:  If I am feeling down, I will contact a friend or relative to talk. I will set up at least one friend date per week    I will continue to run 3 times per week; follow my Hot Chocolate 5K training plan    I will have 3 meals per day. A mini meal is ok    I will journal each morning while I have my protein shake.    NEXT HEALTH  VISITS:  APRIL 4, 8:30 am and April 19, 4pm    Amy ALCARAZ-[facility], National Board Certified Health &   Lead Health   [facility]  [email or web address]  [email or web address]   To schedule: [phone]   Gender pronouns: she/her

## 2024-03-22 NOTE — PROGRESS NOTES
24wk visit 4  SHANT Rosales    PREVIOUS GOALS:  Work-out 4 times per week; follow our run/walk plan 3 times per week, and then 1 time  per week chose something else; wellbeats; yoga  (Run with my roommate, or brother at home; make sure to work-out right after work before goring inside to relax)     Plan one friend-date per week     Look into a fun weekly activity or class to sign up for     STOP (when I feel overwhelmed)      GOALS:  If I am feeling down, I will contact a friend or relative to talk. I will set up at least one friend date per week    I will continue to run 3 times per week; follow my Hot Chocolate 5K training plan    I will have 3 meals per day. A mini meal is ok    I will journal each morning while I have my protein shake.    NEXT HEALTH  VISITS:  APRIL 4, 8:30 am and April 19, 4pm    Amy ALCARAZ-[facility], National Board Certified Health &   Lead Health   [facility] Weight Management  [email or web address]  [email or web address]   To schedule: [phone]   Gender pronouns: she/her

## 2024-03-22 NOTE — PATIENT INSTRUCTIONS
You can call to schedule radiology tests at the following numbers:    [facility] (including mammograms, ultrasound, CT and MRI scans and Dexa Scans)    Call   Toll Free     [facility] (including mammograms, ultrasound, CT and MRI scans and Dexa Scans)    Call   Toll Free

## 2024-03-22 NOTE — PROGRESS NOTES
"  Assessment & Plan     Yanci was seen today for establish care and thyroid problem.    Diagnoses and all orders for this visit:    Hypothyroidism due to Hashimoto's thyroiditis  -     TSH; Future  -     T4, free; Future  -     T3, total; Future  -     CBC with platelets; Future  -     Comprehensive metabolic panel (BMP + Alb, Alk Phos, ALT, AST, Total. Bili, TP); Future    DUB (dysfunctional uterine bleeding)  -     Testosterone Free and Total; Future  -     Follicle stimulating hormone; Future  -     Luteinizing Hormone; Future  -     Prolactin; Future  -     DHEA sulfate; Future  -     17 OH progesterone; Future  -     US Pelvic Complete with Transvaginal; Future    Other orders  -     REVIEW OF HEALTH MAINTENANCE PROTOCOL ORDERS      No follow-ups on file.     BMI  Estimated body mass index is 30.55 kg/m  as calculated from the following:    Height as of this encounter: 1.581 m (5' 2.25\").    Weight as of this encounter: 76.4 kg (168 lb 6.4 oz).     Depression Screening Follow Up        3/22/2024     9:37 AM   PHQ   PHQ-9 Total Score 10   Q9: Thoughts of better off dead/self-harm past 2 weeks Not at all     Follow Up Actions Taken  Crisis resource information provided in After Visit Summary     Brandee Pete is a 23 year old, presenting for the following health issues:  Establish Care and Thyroid Problem        3/22/2024     9:27 AM   Additional Questions   Roomed by Bhupinder WONG     Via the Health Maintenance questionnaire, the patient has reported the following services have been completed -Chlamydia-Cervical Cancer Screening, this information has been sent to the abstraction team.  History of Present Illness       Reason for visit:  Establishing care, assessing hashimotos    She eats 0-1 servings of fruits and vegetables daily.She consumes 2 sweetened beverage(s) daily.She exercises with enough effort to increase her heart rate 20 to 29 minutes per day.  She exercises with enough effort to increase her " "heart rate 3 or less days per week. She is missing 7 dose(s) of medications per week.  She is not taking prescribed medications regularly due to other.       Hypothyroidism Follow-up    Since last visit, patient describes the following symptoms: Weight stable, no hair loss, no skin changes, no constipation, no loose stools, anxiety, and depression    Medication Followup of Levothyroxine & Lexapro  Taking Medication as prescribed: yes & yes  Side Effects:  None x2  Medication Helping Symptoms:  yes & yes  Diagnosed at the age of 21. Lexapro is the first medication she took for depression.talking to a therapist once a week.         3/22/2024     9:37 AM   PHQ   PHQ-9 Total Score 10   Q9: Thoughts of better off dead/self-harm past 2 weeks Not at all         3/22/2024     9:37 AM   GIA-7 SCORE   Total Score 11     02/20/2024 FDLMP: lasted for 1.5 weeks.   Menarche: age 14  Menses: irregular cycles. Patient's last menstrual period was 02/20/2024 (exact date).   Fertility: no  Acne: no  Hirsutism: no  Male-pattern hair loss: no  Elevated serum androgen: no  BMI: Body mass index is 30.55 kg/m .   Type 2 DM: no  Cholesterol: no  Mood disorder: no    Ultrasound: not done    Gynecologic History:  Coitarche: 20  STI history: no  Last Pap: yes - 11/2023. Normal pap smear. [place] [facility].   History of abnormal pap: no  History of cervical procedures: no   Contraceptive History: oral contraceptives    Obstetric History:   OB History   No obstetric history on file.     Review of Systems  Constitutional, neuro, ENT, endocrine, pulmonary, cardiac, gastrointestinal, genitourinary, musculoskeletal, integument and psychiatric systems are negative, except as otherwise noted.      Objective    /74   Pulse 60   Temp 97.4  F (36.3  C) (Temporal)   Resp 14   Ht 1.581 m (5' 2.25\")   Wt 76.4 kg (168 lb 6.4 oz)   LMP 02/20/2024 (Exact Date)   SpO2 99%   BMI 30.55 kg/m    Body mass index is 30.55 kg/m .  Physical Exam   GENERAL: " alert and no distress  NECK: no adenopathy, no asymmetry, masses, or scars  RESP: lungs clear to auscultation - no rales, rhonchi or wheezes  CV: regular rate and rhythm, normal S1 S2, no S3 or S4, no murmur, click or rub, no peripheral edema  ABDOMEN: soft, nontender, no hepatosplenomegaly, no masses and bowel sounds normal  MS: no gross musculoskeletal defects noted, no edema    Results for orders placed or performed in visit on 03/22/24   TSH     Status: Normal   Result Value Ref Range    TSH 3.61 0.30 - 4.20 uIU/mL   T4, free     Status: Normal   Result Value Ref Range    Free T4 1.23 0.90 - 1.70 ng/dL   T3, total     Status: Normal   Result Value Ref Range    T3 Total 95 85 - 202 ng/dL   CBC with platelets     Status: Normal   Result Value Ref Range    WBC Count 5.9 4.0 - 11.0 10e3/uL    RBC Count 4.29 3.80 - 5.20 10e6/uL    Hemoglobin 12.7 11.7 - 15.7 g/dL    Hematocrit 38.4 35.0 - 47.0 %    MCV 90 78 - 100 fL    MCH 29.6 26.5 - 33.0 pg    MCHC 33.1 31.5 - 36.5 g/dL    RDW 12.6 10.0 - 15.0 %    Platelet Count 276 150 - 450 10e3/uL   Comprehensive metabolic panel (BMP + Alb, Alk Phos, ALT, AST, Total. Bili, TP)     Status: Normal   Result Value Ref Range    Sodium 141 135 - 145 mmol/L    Potassium 3.9 3.4 - 5.3 mmol/L    Carbon Dioxide (CO2) 24 22 - 29 mmol/L    Anion Gap 11 7 - 15 mmol/L    Urea Nitrogen 12.1 6.0 - 20.0 mg/dL    Creatinine 0.85 0.51 - 0.95 mg/dL    GFR Estimate >90 >60 mL/min/1.73m2    Calcium 9.5 8.6 - 10.0 mg/dL    Chloride 106 98 - 107 mmol/L    Glucose 93 70 - 99 mg/dL    Alkaline Phosphatase 66 40 - 150 U/L    AST 24 0 - 45 U/L    ALT 32 0 - 50 U/L    Protein Total 7.1 6.4 - 8.3 g/dL    Albumin 4.6 3.5 - 5.2 g/dL    Bilirubin Total 0.8 <=1.2 mg/dL   Follicle stimulating hormone     Status: None   Result Value Ref Range    FSH 5.5 mIU/mL   Luteinizing Hormone     Status: None   Result Value Ref Range    Luteinizing Hormone 11.1 mIU/mL   Prolactin     Status: Abnormal   Result Value Ref  Range    Prolactin 24 (H) 5 - 23 ng/mL   DHEA sulfate     Status: Normal   Result Value Ref Range    DHEA Sulfate 259 35 - 430 ug/dL   Sex Hormone Binding Globulin     Status: Abnormal   Result Value Ref Range    Sex Hormone Binding Globulin 9 (L) 30 - 135 nmol/L   Testosterone Free and Total     Status: None   Result Value Ref Range    Free Testosterone Calculated 1.18 ng/dL    Testosterone Total 38 8 - 60 ng/dL    Narrative    This test was developed and its performance characteristics determined by the [facility],  Special Chemistry Laboratory. It has not been cleared or approved by the FDA. The laboratory is regulated under CLIA as qualified to perform high-complexity testing. This test is used for clinical purposes. It should not be regarded as investigational or for research.   Testosterone Free and Total     Status: Abnormal    Narrative    The following orders were created for panel order Testosterone Free and Total.  Procedure                               Abnormality         Status                     ---------                               -----------         ------                     Sex Hormone Binding Glob...[[id number]]  Abnormal            Final result               Testosterone Free and Total[[id number]]                      Final result                 Please view results for these tests on the individual orders.           Signed Electronically by: Jus Redding MD

## 2024-03-24 LAB
TESTOST FREE SERPL-MCNC: 1.18 NG/DL
TESTOST SERPL-MCNC: 38 NG/DL (ref 8–60)

## 2024-03-28 LAB — 17OHP SERPL-MCNC: 54 NG/DL

## 2024-04-01 NOTE — PROGRESS NOTES
"Video-Visit Details    Type of service:  Video Visit    Video Start Time: 9:35 am  Video End Time: 9:47 am    Originating Location (pt. Location): Home    Distant Location (provider location): Offsite (providers home)     Platform used for Video Visit: CloudCheckr    24 Week Healthy Lifestyle Nutrition Note    Reason for visit: Nutrition Consultation    Yanci Mckeon is a 23 year old female presenting today for nutrition consult. Pt is referred by Dr. Rosales 1/29/24.    Patient with Co-morbidities of obesity including:  GERD     PMH: irregular menses, depression, takes meds for Hashimotos     ANTHROPOMETRICS:   Weight 1/29/24: 182 lbs with BMI 34.39    Estimated body mass index is 31.18 kg/m  as calculated from the following:    Height as of this encounter: 1.549 m (5' 1\").    Weight as of this encounter: 74.8 kg (165 lb).    Current weight: 165 lbs, pt report    MEDICATIONS FOR WEIGHT LOSS:  None    SUPPLEMENTS:  None    NUTRITION HISTORY:  NKFA  No intolerances noted  Not huge on fruit, vegetables or nuts per pt.   Has a sweet tooth  Previous weight loss attempts include: exercise, restriction (eating 1 meal/day) - recognizes this was unhealthy but was only thing that helped at that time  RD before: No    Pt goals: weight loss to improve overall health    Current eating habits are inconsistent per pt.   Typically eats 2-3 meals. Occ may have 1 larger meal.   Snacking varies as well - maybe 3-4x/week on average (as making food typically).     Typical food choices  B - eggs, toast with butter  L - pasta  D - rice with salmon/turkey OR out to eat 3-4x/week    Meal preps but often eats something different than the prepped food.     Feels she needs to increase protein/vegetable intake.     Pt shared she struggles with some binge eating - eating past fullness.     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 Gets nauseous with bigger meals.     Hydration: 1 Kiel of water/day at most (30 oz), occ matcha or coffee     PA: signed up for 5k in April April 2024:    Appetite improving. Is still noticing some nausea with eating. Finds it is worse with bland foods - broccoli sits well. Working on smaller, more frequent eating to help with this. Also discussed importance of hydration.    Did some meal prep yesterday for first time in awhile. Cooked some meat and cut up some vegetables.     Has been trying to eat more regularly. Got some protein shakes - using on workout days. Snacking on things such as broccoli/ranch and soup dumplings.     Hydration: getting at least 1 kiel/day (30 oz), matcha/coffee in morning    PA: prepping for 5k in a week. Mostly running on treadmill right now due to weather.     Previous goals:  Recommend utilizing protein shakes to help with intake - Met  Consider smaller, more frequent eating - Met  Consider grab n go options such as individual carrot bags - Met  Hydration goal: Aim for 2 Kiel s/day (60 oz of water - In progress, mostly doing 1 per day. Wants to keep this goal. No current barriers   Activity goal: 5K next month - Coming up not this weekend but the next.       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    INTERVENTION:  Nutrition Prescription:  Recommended energy/nutrient modification.    Implementation:  Discussed use of 24-week Journal or application  Reviewed  estimated kcal and macronutrient needs  Discussed implementation of exercise  Discussed hydration    Use of meal replacements: no    Patient Understanding: good    GOALS:  Hydration goal: Aim for 2 Fausto s/day (60 oz of water   Activity goal: 5K next weekend  Consider cauliflower as another vegetable example     Snack ideas:   - Cheese   - Cottage cheese   - Protein bars   - Protein shakes   - Yogurt    - Hard boiled eggs   - Meat sticks (recommend turkey more often than beef/pork)   - Raw veggies with dip (hummus, ranch, dill dip, guacamole, cottage cheese)    - Puff corn    - Popcorn    - Crackers      Applications to consider:  - Lose it  - MyfitnessPal  -Better me    Estimated kcal needs for weight loss:    ~1300 kcal/day (with little to no activity accounted for, would recommend more on higher activity days)  *Don t recommend going lower than 1200 kcal     Recommended Macronutrient Needs    Dietary guidelines for Americans   45-65% Carbohydrates   10-35% Protein  20-35% Fat    General Weight loss recommendations   40% Carbohydrates (~130 g/day 1300 kcal/day)   30% Protein  (~97 g/day based on 1300 kcal/day)  30% Fat (~43 g/day based on 1300 kcal/day)     FOLLOW UP  Tuesday, May 14th at 9:30 am    TIME SPENT WITH PATIENT: 12 Minutes     Elsy Graff, LELA, RD, LD

## 2024-04-04 ENCOUNTER — VIRTUAL VISIT (OUTPATIENT)
Dept: ENDOCRINOLOGY | Facility: CLINIC | Age: 24
End: 2024-04-04
Payer: COMMERCIAL

## 2024-04-04 ENCOUNTER — VIRTUAL VISIT (OUTPATIENT)
Dept: ENDOCRINOLOGY | Facility: CLINIC | Age: 24
End: 2024-04-04

## 2024-04-04 VITALS — WEIGHT: 165 LBS | BODY MASS INDEX: 31.15 KG/M2 | HEIGHT: 61 IN

## 2024-04-04 DIAGNOSIS — E66.9 OBESITY: ICD-10-CM

## 2024-04-04 DIAGNOSIS — E66.3 OVERWEIGHT: Primary | ICD-10-CM

## 2024-04-04 DIAGNOSIS — Z71.3 NUTRITIONAL COUNSELING: Primary | ICD-10-CM

## 2024-04-04 PROCEDURE — 99207 PR NO CHARGE LOS: CPT | Mod: 95

## 2024-04-04 PROCEDURE — 97803 MED NUTRITION INDIV SUBSEQ: CPT | Mod: 95 | Performed by: DIETITIAN, REGISTERED

## 2024-04-04 PROCEDURE — 99207 PR NO CHARGE LOS: CPT | Mod: 95 | Performed by: DIETITIAN, REGISTERED

## 2024-04-04 ASSESSMENT — PAIN SCALES - GENERAL: PAINLEVEL: NO PAIN (0)

## 2024-04-04 NOTE — LETTER
"4/4/2024       RE: Yanci Mckeon  [address]  [facility] [address]     Dear Colleague,    Thank you for referring your patient, Yanci Mckeon, to the [facility] WEIGHT MANAGEMENT CLINIC [place] at [facility]. Please see a copy of my visit note below.    Video-Visit Details    Type of service:  Video Visit    Video Start Time: 9:35 am  Video End Time: 9:47 am    Originating Location (pt. Location): Home    Distant Location (provider location): Offsite (providers home)     Platform used for Video Visit: Skyrider    24 Week Healthy Lifestyle Nutrition Note    Reason for visit: Nutrition Consultation    Yanci Mckeon is a 23 year old female presenting today for nutrition consult. Pt is referred by Dr. Rosales 1/29/24.    Patient with Co-morbidities of obesity including:  GERD     PMH: irregular menses, depression, takes meds for Hashimotos     ANTHROPOMETRICS:   Weight 1/29/24: 182 lbs with BMI 34.39    Estimated body mass index is 31.18 kg/m  as calculated from the following:    Height as of this encounter: 1.549 m (5' 1\").    Weight as of this encounter: 74.8 kg (165 lb).    Current weight: 165 lbs, pt report    MEDICATIONS FOR WEIGHT LOSS:  None    SUPPLEMENTS:  None    NUTRITION HISTORY:  NKFA  No intolerances noted  Not huge on fruit, vegetables or nuts per pt.   Has a sweet tooth  Previous weight loss attempts include: exercise, restriction (eating 1 meal/day) - recognizes this was unhealthy but was only thing that helped at that time  RD before: No    Pt goals: weight loss to improve overall health    Current eating habits are inconsistent per pt.   Typically eats 2-3 meals. Occ may have 1 larger meal.   Snacking varies as well - maybe 3-4x/week on average (as making food typically).     Typical food choices  B - eggs, toast with butter  L - pasta  D - rice with salmon/turkey OR out to eat 3-4x/week    Meal preps but often eats " "something different than the prepped food.     Feels she needs to increase protein/vegetable intake.     Pt shared she struggles with some binge eating - eating past fullness.     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 Gets nauseous with bigger meals.     Hydration: 1 Kiel of water/day at most (30 oz), occ matcha or coffee     PA: signed up for 5k in April April 2024:    Appetite improving. Is still noticing some nausea with eating. Finds it is worse with bland foods - broccoli sits well. Working on smaller, more frequent eating to help with this. Also discussed importance of hydration.    Did some meal prep yesterday for first time in awhile. Cooked some meat and cut up some vegetables.     Has been trying to eat more regularly. Got some protein shakes - using on workout days. Snacking on things such as broccoli/ranch and soup dumplings.     Hydration: getting at least 1 kiel/day (30 oz), matcha/coffee in morning    PA: prepping for 5k in a week. Mostly running on treadmill right now due to weather.     Previous goals:  Recommend utilizing protein shakes to help with intake - Met  Consider smaller, more frequent eating - Met  Consider grab n go options such as individual carrot bags - Met  Hydration goal: Aim for 2 Kiel s/day (60 oz of water - In progress, mostly doing 1 per day. Wants to keep this goal. No current barriers   Activity goal: 5K next month - Coming up not this weekend but the next.       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    INTERVENTION:  Nutrition Prescription:  Recommended energy/nutrient modification.    Implementation:  Discussed use of 24-week Journal or application  Reviewed estimated kcal and macronutrient needs  Discussed implementation of exercise  Discussed hydration    Use of meal replacements: no    Patient Understanding: good    GOALS:  Hydration goal: Aim for 2 Fausto s/day (60 oz of water   Activity goal: 5K next weekend  Consider cauliflower as another vegetable example     Snack ideas:   - Cheese   - Cottage cheese   - Protein bars   - Protein shakes   - Yogurt    - Hard boiled eggs   - Meat sticks (recommend turkey more often than beef/pork)   - Raw veggies with dip (hummus, ranch, dill dip, guacamole, cottage cheese)    - Puff corn    - Popcorn    - Crackers      Applications to consider:  - Lose it  - MyfitnessPal  -Better me    Estimated kcal needs for weight loss:    ~1300 kcal/day (with little to no activity accounted for, would recommend more on higher activity days)  *Don t recommend going lower than 1200 kcal     Recommended Macronutrient Needs    Dietary guidelines for Americans   45-65% Carbohydrates   10-35% Protein  20-35% Fat    General Weight loss recommendations   40% Carbohydrates (~130 g/day 1300 kcal/day)   30% Protein  (~97 g/day based on 1300 kcal/day)  30% Fat (~43 g/day based on 1300 kcal/day)     FOLLOW UP  Tuesday, May 14th at 9:30 am    TIME SPENT WITH PATIENT: 12 Minutes     LELA Schofield, RD, LD

## 2024-04-04 NOTE — PROGRESS NOTES
24 week visit 5  Amanda BRAN    PREVIOUS GOALS:  If I am feeling down, I will contact a friend or relative to talk. I will set up at least one friend date per week     I will continue to run 3 times per week; follow my Hot Chocolate 5K training plan     I will have 3 meals per day. A mini meal is ok     I will journal each morning while I have my protein shake.      GOALS: APRIL 4  1.) Journal in the mornings. Journal first, then I can get on my phone.  2.) Bedtime Routine: (I get 8 hours of sleep! I set an alarm to turn my phone off one hour before bedtime) Skincare Routine; Magnesium; Bed  3.) Put a friend date on my calendar for once per week  4.) Continue my Running Plan; 5k in April 5.) [place] and Energetic; Get out of the apt; go out for coffee, take a run. Read a book and people watch while I wait for a friend to meet me at a cafe  6.) Keep a bowl of high protein and healthy snacks available and visible in the fridge; see RD note or wellbeats for ideas      NEXT HEALTH  VISITS: April 19, 4pm     Amy ALCARAZ-[facility], National Board Certified Health &   Lead Health   [facility] Comprehensive Weight Management  [email or web address]  [email or web address]   To schedule: [phone]   Gender pronouns: she/her

## 2024-04-04 NOTE — PATIENT INSTRUCTIONS
GOALS:  Hydration goal: Aim for 2 Fausto s/day (60 oz of water   Activity goal: 5K next weekend  Consider cauliflower as another vegetable example     Snack ideas:   - Cheese   - Cottage cheese   - Protein bars   - Protein shakes   - Yogurt    - Hard boiled eggs   - Meat sticks (recommend turkey more often than beef/pork)   - Raw veggies with dip (hummus, ranch, dill dip, guacamole, cottage cheese)    - Puff corn    - Popcorn    - Crackers      Applications to consider:  - Lose it  - MyfitnessPal  -Better me    Estimated kcal needs for weight loss:    ~1300 kcal/day (with little to no activity accounted for, would recommend more on higher activity days)  *Don t recommend going lower than 1200 kcal     Recommended Macronutrient Needs    Dietary guidelines for Americans   45-65% Carbohydrates   10-35% Protein  20-35% Fat    General Weight loss recommendations   40% Carbohydrates (~130 g/day 1300 kcal/day)   30% Protein  (~97 g/day based on 1300 kcal/day)  30% Fat (~43 g/day based on 1300 kcal/day)     FOLLOW UP  Tuesday, May 14th at 9:30 am    Elsy Galeana ([place]), VIRGIL-MUKUL, RD, LD  Clinic #: [phone]

## 2024-04-04 NOTE — LETTER
4/4/2024       RE: Yanci Mckeon  [address]  [facility] [address]     Dear Colleague,    Thank you for referring your patient, Yanci Mckeon, to the [facility] WEIGHT MANAGEMENT CLINIC [place] at [facility]. Please see a copy of my visit note below.    24 week visit 5  Amanda BRAN    PREVIOUS GOALS:  If I am feeling down, I will contact a friend or relative to talk. I will set up at least one friend date per week     I will continue to run 3 times per week; follow my Hot Chocolate 5K training plan     I will have 3 meals per day. A mini meal is ok     I will journal each morning while I have my protein shake.      GOALS: APRIL 4  1.) Journal in the mornings. Journal first, then I can get on my phone.  2.) Bedtime Routine: (I get 8 hours of sleep! I set an alarm to turn my phone off one hour before bedtime) Skincare Routine; Magnesium; Bed  3.) Put a friend date on my calendar for once per week  4.) Continue my Running Plan; 5k in April  5.) [place] and Energetic; Get out of the apt; go out for coffee, take a run. Read a book and people watch while I wait for a friend to meet me at a cafe  6.) Keep a bowl of high protein and healthy snacks available and visible in the fridge; see RD note or wellbeats for ideas      NEXT HEALTH  VISITS: April 19, 4pm     Amy ALCARAZ-[facility], National Board Certified Health &   Lead Health   [facility] Weight Management  [email or web address]  [email or web address]   To schedule: [phone]   Gender pronouns: she/her

## 2024-04-04 NOTE — NURSING NOTE
Is the patient currently in the state of [place]? YES    Visit mode:VIDEO    If the visit is dropped, the patient can be reconnected by: VIDEO VISIT: Text to cell phone:   Telephone Information:   Mobile [phone]       Will anyone else be joining the visit? NO  (If patient encounters technical issues they should call [phone]    How would you like to obtain your AVS? MyChart    Are changes needed to the allergy or medication list? N/A    Reason for visit: EDUAR DE LA CRUZ

## 2024-04-19 ENCOUNTER — VIRTUAL VISIT (OUTPATIENT)
Dept: ENDOCRINOLOGY | Facility: CLINIC | Age: 24
End: 2024-04-19

## 2024-04-19 DIAGNOSIS — E66.3 OVERWEIGHT: Primary | ICD-10-CM

## 2024-04-19 PROCEDURE — 99207 PR NO CHARGE LOS: CPT | Mod: 95

## 2024-04-19 NOTE — LETTER
4/19/2024       RE: Yanci Mckeon  [address]  [facility] [facility] [address]     Dear Colleague,    Thank you for referring your patient, Yanci Mckeon, to the [facility] WEIGHT MANAGEMENT CLINIC [place] at [facility]. Please see a copy of my visit note below.    HC visit 6, video  Bobby  Initial Wght: 182 (January)  CW; 165 lb (last wght with Elsy Galeana RD)    1.) Journaling as a tool for emotional wellbeing as needed  2.) Bedtime Routine: (I get 8 hours of sleep! I set an alarm to turn my phone off one hour before bedtime) Skincare Routine; Magnesium; Bed. I get 7 hours of sleep on night shift days; and stay in bed and rest 7 hours even if I am not sleeping.   3.) Put a friend date on my calendar for once per week  4.) Select specific days each week to do weight lifting, and to do a walk/run. Aim  for exercise 3-5 days per week. Map out my plan each week to be accountable. Do it even if I do not feel like it.   5.)If I feel heavy or down, get out and about. Keep it [place] and Energetic; Get out of the apt; go out for coffee, take a run. Read a book and people watch while I wait for a friend to meet me at a cafe  6.) Keep a bowl of high protein and healthy snacks available and visible in the fridge; see RD note or wellbeats for ideas      NEXT HEALTH  VISIT: May 14 at 10:30 am video visit with Mariana DOS SANTOS visit with Elsy funez at 9:30 am       Amy Noland  NB-[facility], National Board Certified Health &   Lead Health   [facility] Weight Management  [email or web address]  "[email or web address]   To schedule: [phone]   Gender pronouns: she/her

## 2024-04-19 NOTE — PROGRESS NOTES
HC visit 6, marlon Rosales  Initial Wght: 182 (January)  CW; 165 lb (last wght with Elsy Galeana RD)    1.) Journaling as a tool for emotional wellbeing as needed  2.) Bedtime Routine: (I get 8 hours of sleep! I set an alarm to turn my phone off one hour before bedtime) Skincare Routine; Magnesium; Bed. I get 7 hours of sleep on night shift days; and stay in bed and rest 7 hours even if I am not sleeping.   3.) Put a friend date on my calendar for once per week  4.) Select specific days each week to do weight lifting, and to do a walk/run. Aim  for exercise 3-5 days per week. Map out my plan each week to be accountable. Do it even if I do not feel like it.   5.)If I feel heavy or down, get out and about. Keep it [place] and Energetic; Get out of the apt; go out for coffee, take a run. Read a book and people watch while I wait for a friend to meet me at a cafe  6.) Keep a bowl of high protein and healthy snacks available and visible in the fridge; see RD note or wellbeats for ideas      NEXT HEALTH  VISIT: May 14 at 10:30 am video visit with Amy; RD visit with Elsy funez at 9:30 am       Amy Noland  [facility], National Board Certified Health &   Lead Health   [facility] Weight Management  [email or web address]  [email or web address]   To schedule: [phone]   Gender pronouns: she/her

## 2025-03-16 ENCOUNTER — HEALTH MAINTENANCE LETTER (OUTPATIENT)
Age: 25
End: 2025-03-16

## 2025-06-23 ENCOUNTER — OFFICE VISIT (OUTPATIENT)
Dept: OBGYN | Facility: CLINIC | Age: 25
End: 2025-06-23
Payer: COMMERCIAL

## 2025-06-23 ENCOUNTER — RESULTS FOLLOW-UP (OUTPATIENT)
Dept: OBGYN | Facility: CLINIC | Age: 25
End: 2025-06-23

## 2025-06-23 ENCOUNTER — TELEPHONE (OUTPATIENT)
Dept: OBGYN | Facility: CLINIC | Age: 25
End: 2025-06-23
Payer: COMMERCIAL

## 2025-06-23 VITALS
DIASTOLIC BLOOD PRESSURE: 75 MMHG | HEART RATE: 74 BPM | SYSTOLIC BLOOD PRESSURE: 113 MMHG | HEIGHT: 61 IN | BODY MASS INDEX: 31.18 KG/M2

## 2025-06-23 DIAGNOSIS — N89.8 VAGINAL ITCHING: Primary | ICD-10-CM

## 2025-06-23 LAB
BACTERIAL VAGINOSIS VAG-IMP: NEGATIVE
CANDIDA DNA VAG QL NAA+PROBE: NOT DETECTED
CANDIDA GLABRATA / CANDIDA KRUSEI DNA: NOT DETECTED
T VAGINALIS DNA VAG QL NAA+PROBE: NOT DETECTED

## 2025-06-23 PROCEDURE — 3078F DIAST BP <80 MM HG: CPT

## 2025-06-23 PROCEDURE — 99203 OFFICE O/P NEW LOW 30 MIN: CPT

## 2025-06-23 PROCEDURE — 3074F SYST BP LT 130 MM HG: CPT

## 2025-06-23 PROCEDURE — 81515 NFCT DS BV&VAGINITIS DNA ALG: CPT

## 2025-06-23 PROCEDURE — 99213 OFFICE O/P EST LOW 20 MIN: CPT

## 2025-06-23 RX ORDER — FLUCONAZOLE 150 MG/1
150 TABLET ORAL ONCE
Qty: 1 TABLET | Refills: 1 | Status: SHIPPED | OUTPATIENT
Start: 2025-06-23 | End: 2025-06-23

## 2025-06-23 ASSESSMENT — ANXIETY QUESTIONNAIRES
GAD7 TOTAL SCORE: 11
4. TROUBLE RELAXING: MORE THAN HALF THE DAYS
GAD7 TOTAL SCORE: 11
7. FEELING AFRAID AS IF SOMETHING AWFUL MIGHT HAPPEN: SEVERAL DAYS
7. FEELING AFRAID AS IF SOMETHING AWFUL MIGHT HAPPEN: SEVERAL DAYS
2. NOT BEING ABLE TO STOP OR CONTROL WORRYING: MORE THAN HALF THE DAYS
8. IF YOU CHECKED OFF ANY PROBLEMS, HOW DIFFICULT HAVE THESE MADE IT FOR YOU TO DO YOUR WORK, TAKE CARE OF THINGS AT HOME, OR GET ALONG WITH OTHER PEOPLE?: SOMEWHAT DIFFICULT
GAD7 TOTAL SCORE: 11
3. WORRYING TOO MUCH ABOUT DIFFERENT THINGS: MORE THAN HALF THE DAYS
1. FEELING NERVOUS, ANXIOUS, OR ON EDGE: MORE THAN HALF THE DAYS
6. BECOMING EASILY ANNOYED OR IRRITABLE: SEVERAL DAYS
5. BEING SO RESTLESS THAT IT IS HARD TO SIT STILL: SEVERAL DAYS
IF YOU CHECKED OFF ANY PROBLEMS ON THIS QUESTIONNAIRE, HOW DIFFICULT HAVE THESE PROBLEMS MADE IT FOR YOU TO DO YOUR WORK, TAKE CARE OF THINGS AT HOME, OR GET ALONG WITH OTHER PEOPLE: SOMEWHAT DIFFICULT

## 2025-06-23 NOTE — PATIENT INSTRUCTIONS
Thank you for trusting us with your care!   Please be aware, if you are on Mychart, you may see your results prior to your providers review. If labs are abnormal, we will call or message you on Fangcangt with a follow up plan.    If you need to contact us for questions about:  Symptoms, Scheduling & Medical Questions; Non-urgent (2-3 day response) EventBoard message, Urgent (needing response today) [phone] (if after 3:30pm next day response)   Prescriptions: Please call your Pharmacy   Billing: [place] [phone] or [facility]:[phone]

## 2025-06-23 NOTE — LETTER
"6/23/2025       RE: Yanci Mckeon  [address]  [place] [address]     Dear Colleague,    Thank you for referring your patient, Yanci Mckeon, to the [facility] WOMEN'S CLINIC [place] at [facility]. Please see a copy of my visit note below.    Office Problem Visit      Subjective:    Yanci is a 25 year old who reports vaginal itching.  Completed UA, GC, MVP on 6/4 which was negative besides yeast. She tried over the counter monistat which hasn't helped.     Desires self swab.     Objective:   Vitals:    Vitals:    06/23/25 1508   BP: 113/75   Pulse: 74   Height: 1.549 m (5' 1\")       Physical Exam:  General: Pleasant, articulate, well-groomed, well-nourished female.  Not in any apparent distress.    Assessment/Plan:   Diagnoses and all orders for this visit:  Vaginal itching  -     fluconazole (DIFLUCAN) 150 MG tablet; Take 1 tablet (150 mg) by mouth once for 1 dose. Repeat in 1 week if not successful.  -     Multiplex Vaginal Panel by PCR    - Reviewed vulvar hygiene practices.   - Discussed returning to clinic with sx that are not improving or worsening.   Answers submitted by the patient for this visit:  Patient Health Questionnaire (G7) (Submitted on 6/23/2025)  GIA 7 TOTAL SCORE: 11      Again, thank you for allowing me to participate in the care of your patient.      Sincerely,    CHERI Valle CNM    "

## 2025-06-23 NOTE — PROGRESS NOTES
"Office Problem Visit      Subjective:    Yanci is a 25 year old who reports vaginal itching.  Completed UA, GC, MVP on 6/4 which was negative besides yeast. She tried over the counter monistat which hasn't helped.     Desires self swab.     Objective:   Vitals:    Vitals:    06/23/25 1508   BP: 113/75   Pulse: 74   Height: 1.549 m (5' 1\")       Physical Exam:  General: Pleasant, articulate, well-groomed, well-nourished female.  Not in any apparent distress.    Assessment/Plan:   Diagnoses and all orders for this visit:  Vaginal itching  -     fluconazole (DIFLUCAN) 150 MG tablet; Take 1 tablet (150 mg) by mouth once for 1 dose. Repeat in 1 week if not successful.  -     Multiplex Vaginal Panel by PCR    - Reviewed vulvar hygiene practices.   - Discussed returning to clinic with sx that are not improving or worsening.   Answers submitted by the patient for this visit:  Patient Health Questionnaire (G7) (Submitted on 6/23/2025)  GIA 7 TOTAL SCORE: 11    "